# Patient Record
Sex: FEMALE | URBAN - NONMETROPOLITAN AREA
[De-identification: names, ages, dates, MRNs, and addresses within clinical notes are randomized per-mention and may not be internally consistent; named-entity substitution may affect disease eponyms.]

---

## 2017-01-17 ENCOUNTER — TELEPHONE (OUTPATIENT)
Dept: GASTROENTEROLOGY | Facility: CLINIC | Age: 52
End: 2017-01-17

## 2017-01-17 NOTE — TELEPHONE ENCOUNTER
----- Message from Dante Verdugo sent at 1/16/2017  3:47 PM CST -----  Regarding: RECALL  SPOKE WITH PT. SHE HAS MOVED TO [place] AND HAS HAD HER COLONOSCOPY.